# Patient Record
Sex: FEMALE | Race: WHITE | NOT HISPANIC OR LATINO | Employment: OTHER | URBAN - NONMETROPOLITAN AREA
[De-identification: names, ages, dates, MRNs, and addresses within clinical notes are randomized per-mention and may not be internally consistent; named-entity substitution may affect disease eponyms.]

---

## 2021-01-27 DIAGNOSIS — Z23 ENCOUNTER FOR IMMUNIZATION: ICD-10-CM

## 2024-11-13 ENCOUNTER — EVALUATION (OUTPATIENT)
Dept: PHYSICAL THERAPY | Facility: CLINIC | Age: 79
End: 2024-11-13
Payer: MEDICARE

## 2024-11-13 DIAGNOSIS — R26.2 DIFFICULTY WALKING: Primary | ICD-10-CM

## 2024-11-13 DIAGNOSIS — R42 DIZZINESS: ICD-10-CM

## 2024-11-13 PROCEDURE — 97112 NEUROMUSCULAR REEDUCATION: CPT

## 2024-11-13 PROCEDURE — 97162 PT EVAL MOD COMPLEX 30 MIN: CPT

## 2024-11-13 NOTE — LETTER
2024    No Recipients    Patient: Kia Karimi   YOB: 1945   Date of Visit: 2024     Encounter Diagnosis     ICD-10-CM    1. Difficulty walking  R26.2       2. Dizziness  R42           Dear Dr. Kraft:    Thank you for your recent referral of Kia Karimi. Please review the attached evaluation summary from Kia's recent visit.     Please verify that you agree with the plan of care by signing the attached order.     If you have any questions or concerns, please do not hesitate to call.     I sincerely appreciate the opportunity to share in the care of one of your patients and hope to have another opportunity to work with you in the near future.       Sincerely,    Danielito Grier, PT      Referring Provider:      I certify that I have read the below Plan of Care and certify the need for these services furnished under this plan of treatment while under my care.                    FELIBERTO Koo  [address] [facility] [address]  Via Fax: [phone]          PT Evaluation     Today's date: 2024  Patient name: Kia Karimi  : 1945  MRN: [id number]  Referring provider: Angeles Kraft CRNP  Dx:   Encounter Diagnosis     ICD-10-CM    1. Difficulty walking  R26.2       2. Dizziness  R42           Start Time: 1045  Stop Time: 1145  Total time in clinic (min): 60 minutes    Assessment  Impairments: abnormal coordination, abnormal gait, activity intolerance, impaired balance, lacks appropriate home exercise program and safety issue  Symptom irritability: moderate    Assessment details: Patient is a 80 y/o female who presents to physical therapy with chronic dizziness and balance issues.  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  PT suspects ocular dysfunction is the main contributing factor.  All positional tests were negative.  All red flag signs and symptoms were negative.  Neuro re-ed was initiated to  improve VOR response and eye tracking.  Patient requires skilled PT to improve balance, improve safety, and decrease dizziness.  Plan to progress patient with oculomotor training and balance activities.     Understanding of Dx/Px/POC: good     Prognosis: good    Goals  STG 1. Patient will reports 50% improvement in dizziness in 2 weeks to display improved quality of life.  STG 2. Patient will report no decreased ability to perform ADLs in 2 weeks due to dizziness.    LTG 1. Patient will present with normal oculomotor testing and positional testing in 4 weeks to display normal vestibular function.  LTG 2.  Patient will report resolution of dizziness to improve quality of life and function in 4 weeks.     Plan  Patient would benefit from: PT eval and skilled physical therapy  Referral necessary: No  Planned modality interventions: cryotherapy and thermotherapy: hydrocollator packs    Planned therapy interventions: patient education, therapeutic activities, home exercise program, flexibility, postural training, manual therapy, neuromuscular re-education, canalith repositioning and therapeutic exercise    Frequency: 2x week  Duration in weeks: 8  Plan of Care beginning date: 11/13/2024  Plan of Care expiration date: 1/8/2025  Treatment plan discussed with: patient      Subjective Evaluation    History of Present Illness  Date of onset: 11/1/2023  Mechanism of injury: Patient reports she has been having vertigo for about a year or so.  Patient reports it has been worsening and she is tired of being dizzy so she decided to seek out treatment.  Patient reports the dizziness is always present.  Patient reports head turns worsen her dizziness.  Patient denies spinning sensation.  Patient reports she feels unsteady more so. Patient was trying to describe why her eye was not moving and described it as something like a stroke behind the eye.  PT suspects she is describing a retinal artery occlusion.  Patient did have difficulty  providing accurate history and subjective.          Recurrent probem    Patient Goals  Patient goals for therapy: improved balance  Patient goal: abolish dizziness  Pain  No pain reported    Social Support  Lives in: one-story house  Lives with: alone    Employment status: not working    Diagnostic Tests  MRI studies: normal  Treatments  Current treatment: physical therapy      Objective     Concurrent Complaints  Negative for headaches  Neuro Exam:     Dizziness  Positive for rocking or swaying.   Negative for vertigo, motion sickness and light-headedness.     Headaches   Patient reports headaches: No.     Oculomotor exam   Oculomotor ROM: diminished and Poor R eye tracking  Resting nystagmus: not present   Gaze holding nystagmus: not present left  and not present right  Smooth pursuits: within normal limits  Vertical saccades: hypometric  Horizontal saccades: hypometric   Convergence: R eye does not converge  Convergence: abnormal  Cover test: normal  Crossover test: normal  Head thrust: left normal and right normal    Positional testing   [place]-Hallpike   Left posterior canal: WNL  Right posterior canal: WNL  Roll test   Left horizontal canal: WNL  Right horizontal canal: WNL  Positional testing comment: Extreme difficulty with VOR reflex due to poor R eye tracking             Precautions: Hx of R Retinal artery occlusion       Manuals 11/13                                                                Neuro Re-Ed             Vert and Horiz Gaze Stab 2x20 ea            Saccades Vert and Horiz 2x20 ea            Pencil push up  2x20                                                               Ther Ex                                                                                                                     Ther Activity                                       Gait Training                                       Modalities

## 2024-11-15 ENCOUNTER — OFFICE VISIT (OUTPATIENT)
Dept: PHYSICAL THERAPY | Facility: CLINIC | Age: 79
End: 2024-11-15
Payer: MEDICARE

## 2024-11-15 DIAGNOSIS — R42 DIZZINESS: ICD-10-CM

## 2024-11-15 DIAGNOSIS — R26.2 DIFFICULTY WALKING: Primary | ICD-10-CM

## 2024-11-15 PROCEDURE — 97112 NEUROMUSCULAR REEDUCATION: CPT

## 2024-11-15 NOTE — PROGRESS NOTES
"Daily Note     Today's date: 11/15/2024  Patient name: Kia Karimi  : 1945  MRN: [id number]  Referring provider: Angeles Kraft CRNP  Dx:   Encounter Diagnosis     ICD-10-CM    1. Difficulty walking  R26.2       2. Dizziness  R42           Start Time: 945  Stop Time: 1020  Total time in clinic (min): 35 minutes    Subjective: Patient reports her dizziness is about the same today.  Patient reports she has been keeping up with the exercises at home.      Objective: See treatment diary below      Assessment: Tolerated treatment well. Patient would benefit from continued PT.  Patient tolerated VOR and oculomotor exercises well.  Patient has much greater ease with saccadic motions than VOR.  Static balance progressions were added to improve safety and stability.  Will progress patient with continued balance and oculomotor progressions.      Plan: Continue per plan of care.  Progress treatment as tolerated.       Precautions: Hx of R Retinal artery occlusion       Manuals 11/13 11/15                                                               Neuro Re-Ed             Vert and Horiz Gaze Stab 2x20 ea 2x20 ea           Saccades Vert and Horiz 2x20 ea 2x20 ea            Pencil push up  2x20           Diagonal Saccades  2x20 ea           SLS   3x30\"           NBOS EC  3x30\"           NBOS Foam  3x30\"           Ther Ex                                                                                                                     Ther Activity                                       Gait Training                                       Modalities                                            "

## 2024-11-18 ENCOUNTER — OFFICE VISIT (OUTPATIENT)
Dept: PHYSICAL THERAPY | Facility: CLINIC | Age: 79
End: 2024-11-18
Payer: MEDICARE

## 2024-11-18 DIAGNOSIS — R42 DIZZINESS: Primary | ICD-10-CM

## 2024-11-18 DIAGNOSIS — R26.2 DIFFICULTY WALKING: ICD-10-CM

## 2024-11-18 PROCEDURE — 97112 NEUROMUSCULAR REEDUCATION: CPT

## 2024-11-18 NOTE — PROGRESS NOTES
"Daily Note     Today's date: 2024  Patient name: Kia Karimi  : 1945  MRN: [id number]  Referring provider: Angeles Kraft CRNP  Dx:   Encounter Diagnosis     ICD-10-CM    1. Dizziness  R42       2. Difficulty walking  R26.2           Start Time: 955  Stop Time: 1040  Total time in clinic (min): 45 minutes    Subjective: Patient notes some improvement in her dizziness since starting PT.  Patient reports the exercises have been getting easier.      Objective: See treatment diary below      Assessment: Tolerated treatment well. Patient would benefit from continued PT.  Oculomotor exercise were continued in order to improve visual tracking and decrease corrective saccades.  Balance exercises were performed today and progressed with dynamic balance as well.  Blaze pod drills also added today.  Patient tolerated all exercises well.  VOR exercises provide the greatest challenge for patient.  Patient is doing well with balance exercises.  Plan to focus on more functional dynamic progressions.      Plan: Continue per plan of care.  Progress treatment as tolerated.       Precautions: Hx of R Retinal artery occlusion       Manuals 11/13 11/15 11/18                                                              Neuro Re-Ed             Vert and Horiz Gaze Stab 2x20 ea 2x20 ea 2x20 ea           Saccades Vert and Horiz 2x20 ea 2x20 ea  2x20 ea          Pencil push up  2x20 2x20          Diagonal Saccades  2x20 ea 2x20 ea          SLS   3x30\" 3x30\"          NBOS EC  3x30\" 3x30\"          NBOS Foam  3x30\" 3x30\"          Dynamic Balance EC, Tandem   2 laps ea          Blaze Pods    3x30\"                                    Ther Ex                                                                                                                     Ther Activity                                       Gait Training                                       Modalities                                              "

## 2024-11-21 ENCOUNTER — OFFICE VISIT (OUTPATIENT)
Dept: PHYSICAL THERAPY | Facility: CLINIC | Age: 79
End: 2024-11-21
Payer: MEDICARE

## 2024-11-21 DIAGNOSIS — R42 DIZZINESS: Primary | ICD-10-CM

## 2024-11-21 DIAGNOSIS — R26.2 DIFFICULTY WALKING: ICD-10-CM

## 2024-11-21 PROCEDURE — 97112 NEUROMUSCULAR REEDUCATION: CPT

## 2024-11-21 NOTE — PROGRESS NOTES
"Daily Note     Today's date: 2024  Patient name: Kia Karimi  : 1945  MRN: [id number]  Referring provider: Angeles Kraft CRNP  Dx:   Encounter Diagnosis     ICD-10-CM    1. Dizziness  R42       2. Difficulty walking  R26.2           Start Time: 915  Stop Time: 1000  Total time in clinic (min): 45 minutes    Subjective: Patient reports yesterday was a good day with the dizziness.  She reports that is the first good day she has had in a while.      Objective: See treatment diary below      Assessment: Tolerated treatment well. Patient would benefit from continued PT.  Patient was progressed today with standing blaze pod taps to improve visual tracking.  Patient is performing exercises with greater ease.      Plan: Continue per plan of care.  Progress treatment as tolerated.       Precautions: Hx of R Retinal artery occlusion       Manuals 11/13 11/15 11/18 11/21                                                             Neuro Re-Ed             Vert and Horiz Gaze Stab 2x20 ea 2x20 ea 2x20 ea  2x20 ea         Saccades Vert and Horiz 2x20 ea 2x20 ea  2x20 ea 2x20 ea         Pencil push up  2x20 2x20 2x20         Diagonal Saccades  2x20 ea 2x20 ea 2x20 ea         SLS   3x30\" 3x30\" 3x30\"         NBOS EC  3x30\" 3x30\" 3x30\"         NBOS Foam  3x30\" 3x30\" 3x30\"         Dynamic Balance EC, Tandem   2 laps ea 2 laps ea         Blaze Pods    3x30\" 3x30\"         Blaze Pods at Wall    3x30\"                      Ther Ex                                                                                                                     Ther Activity                                       Gait Training                                       Modalities                                                  "

## 2024-12-03 ENCOUNTER — OFFICE VISIT (OUTPATIENT)
Dept: PHYSICAL THERAPY | Facility: CLINIC | Age: 79
End: 2024-12-03
Payer: MEDICARE

## 2024-12-03 DIAGNOSIS — R26.2 DIFFICULTY WALKING: ICD-10-CM

## 2024-12-03 DIAGNOSIS — R42 DIZZINESS: Primary | ICD-10-CM

## 2024-12-03 PROCEDURE — 97112 NEUROMUSCULAR REEDUCATION: CPT

## 2024-12-03 NOTE — PROGRESS NOTES
"Daily Note     Today's date: 12/3/2024  Patient name: Kia Karimi  : 1945  MRN: [id number]  Referring provider: Angeles Kraft CRNP  Dx:   Encounter Diagnosis     ICD-10-CM    1. Dizziness  R42       2. Difficulty walking  R26.2                      Subjective: Patient reports that she had no dizziness but balance is still limited. Her main issue is if she moves her head fast vertically.       Objective: See treatment diary below.       Assessment: Tolerated treatment fair. Patient would benefit from continued PT to improve balance and stability for functionality. She is unsteady in a NBOS, on uneven surfaces, and with faster head turns. No dizziness during saccades or gaze stabilization. Will progress as able and to tolerance.       Plan: Continue per plan of care.      Precautions: Hx of R Retinal artery occlusion       Manuals 11/13 11/15 11/18 11/21 12/3                                                  Neuro Re-Ed           Vert and Horiz Gaze Stab 2x20 ea 2x20 ea 2x20 ea  2x20 ea 2x20      Saccades Vert and Horiz 2x20 ea 2x20 ea  2x20 ea 2x20 ea 2x20      Pencil push up  2x20 2x20 2x20 2x20      Diagonal Saccades  2x20 ea 2x20 ea 2x20 ea 2x20      SLS   3x30\" 3x30\" 3x30\" 30\"x3      Tandem stance     Firm 30\"x3       NBOS EC  3x30\" 3x30\" 3x30\" 30\"x3      NBOS Foam  3x30\" 3x30\" 3x30\" 30\"x3      Dynamic Balance EC, Tandem   2 laps ea 2 laps ea 2 laps       NBOS on Foam head turns     10x ea      Blaze Pods    3x30\" 3x30\"       Blaze Pods at Wall    3x30\"                  Ther Ex                                                                                                   Ther Activity                                 Gait Training                                 Modalities                                              "

## 2024-12-05 ENCOUNTER — APPOINTMENT (OUTPATIENT)
Dept: PHYSICAL THERAPY | Facility: CLINIC | Age: 79
End: 2024-12-05
Payer: MEDICARE

## 2024-12-10 ENCOUNTER — OFFICE VISIT (OUTPATIENT)
Dept: PHYSICAL THERAPY | Facility: CLINIC | Age: 79
End: 2024-12-10
Payer: MEDICARE

## 2024-12-10 DIAGNOSIS — R26.2 DIFFICULTY WALKING: ICD-10-CM

## 2024-12-10 DIAGNOSIS — R42 DIZZINESS: Primary | ICD-10-CM

## 2024-12-10 PROCEDURE — 97112 NEUROMUSCULAR REEDUCATION: CPT

## 2024-12-10 NOTE — PROGRESS NOTES
"Daily Note     Today's date: 12/10/2024  Patient name: Kia Karimi  : 1945  MRN: [id number]  Referring provider: Angeles Kraft CRNP  Dx:   Encounter Diagnosis     ICD-10-CM    1. Dizziness  R42       2. Difficulty walking  R26.2           Start Time: 1300  Stop Time: 1345  Total time in clinic (min): 45 minutes    Subjective: Patient reports her dizziness has gotten a bit better.  She reports her balance is also gradually improving and she feels more steady when walking.      Objective: See treatment diary below      Assessment: Tolerated treatment well. Patient would benefit from continued PT.  Oculomotor exercises were continued to improve VOR and visual tracking.  Patient was able to perform exercises with less cueing this time.  Progressions from last session were continued and still provided adequate challenge.  Plan to progress patient with continued oculomotor and balance progressions to improve safety and function.      Plan: Continue per plan of care.  Progress treatment as tolerated.       Precautions: Hx of R Retinal artery occlusion       Manuals 11/13 11/15 11/18 11/21 12/3 12/10                                                 Neuro Re-Ed           Vert and Horiz Gaze Stab 2x20 ea 2x20 ea 2x20 ea  2x20 ea 2x20 2x20     Saccades Vert and Horiz 2x20 ea 2x20 ea  2x20 ea 2x20 ea 2x20 2x20     Pencil push up  2x20 2x20 2x20 2x20 2x20     Diagonal Saccades  2x20 ea 2x20 ea 2x20 ea 2x20 2x20     SLS   3x30\" 3x30\" 3x30\" 30\"x3 30\"x3     Tandem stance     Firm 30\"x3       NBOS EC  3x30\" 3x30\" 3x30\" 30\"x3 30\"x3     NBOS Foam  3x30\" 3x30\" 3x30\" 30\"x3 30\"x3     Dynamic Balance EC, Tandem   2 laps ea 2 laps ea 2 laps       NBOS on Foam head turns     10x ea 10xea     Blaze Pods    3x30\" 3x30\"  2x30\"     Blaze Pods at Wall    3x30\"  3x30\"                Ther Ex                                                                                                   Ther Activity                               "   Gait Training                                 Modalities

## 2024-12-12 ENCOUNTER — OFFICE VISIT (OUTPATIENT)
Dept: PHYSICAL THERAPY | Facility: CLINIC | Age: 79
End: 2024-12-12
Payer: MEDICARE

## 2024-12-12 DIAGNOSIS — R42 DIZZINESS: Primary | ICD-10-CM

## 2024-12-12 DIAGNOSIS — R26.2 DIFFICULTY WALKING: ICD-10-CM

## 2024-12-12 PROCEDURE — 97112 NEUROMUSCULAR REEDUCATION: CPT

## 2024-12-12 NOTE — PROGRESS NOTES
"Daily Note     Today's date: 2024  Patient name: Kia Karimi  : 1945  MRN: [id number]  Referring provider: Angeles Kraft CRNP  Dx:   Encounter Diagnosis     ICD-10-CM    1. Dizziness  R42       2. Difficulty walking  R26.2           Start Time: 1300  Stop Time: 1340  Total time in clinic (min): 40 minutes    Subjective: Patient reports her balance is improving.  She reports the dizziness is slightly improving.   She reports she is going to get her vision checked to see if that is playing a role.      Objective: See treatment diary below      Assessment: Tolerated treatment well. Patient would benefit from continued PT.  All oculomotor exercises were continued with greater ease today.  Plan to add movement to VOR exercises next session.  Patient is displaying less LOB with dynamic and static balance exercises.        Plan: Continue per plan of care.  Progress treatment as tolerated.       Precautions: Hx of R Retinal artery occlusion       Manuals 11/13 11/15 11/18 11/21 12/3 12/10 12/12                                                Neuro Re-Ed           Vert and Horiz Gaze Stab 2x20 ea 2x20 ea 2x20 ea  2x20 ea 2x20 2x20 2x20 2x20 w/ movement   Saccades Vert and Horiz 2x20 ea 2x20 ea  2x20 ea 2x20 ea 2x20 2x20 2x20    Pencil push up  2x20 2x20 2x20 2x20 2x20 2x20    Diagonal Saccades  2x20 ea 2x20 ea 2x20 ea 2x20 2x20 2x20    SLS   3x30\" 3x30\" 3x30\" 30\"x3 30\"x3 30\"x3    Tandem stance     Firm 30\"x3  Firm 30\"x3 Firm 30\"x3    NBOS EC  3x30\" 3x30\" 3x30\" 30\"x3 30\"x3 30\"x3 on foam    NBOS Foam  3x30\" 3x30\" 3x30\" 30\"x3 30\"x3 D/C    Dynamic Balance EC, Tandem   2 laps ea 2 laps ea 2 laps  2 laps 2 laps    NBOS on Foam head turns     10x ea 10xea 10x ea    Blaze Pods    3x30\" 3x30\"  2x30\" 2x30\"    Blaze Pods at Wall    3x30\"  3x30\" 3x30\"    Trunk Twists on foam       20x     Ther Ex                                                                                                   Ther Activity               "                   Gait Training                                 Modalities

## 2024-12-17 ENCOUNTER — EVALUATION (OUTPATIENT)
Dept: PHYSICAL THERAPY | Facility: CLINIC | Age: 79
End: 2024-12-17
Payer: MEDICARE

## 2024-12-17 DIAGNOSIS — R42 DIZZINESS: Primary | ICD-10-CM

## 2024-12-17 DIAGNOSIS — R26.2 DIFFICULTY WALKING: ICD-10-CM

## 2024-12-17 PROCEDURE — 97112 NEUROMUSCULAR REEDUCATION: CPT

## 2024-12-17 NOTE — PROGRESS NOTES
PT Re-Evaluation     Today's date: 2024  Patient name: Kia Karimi  : 1945  MRN: [id number]  Referring provider: Angeles Kraft CRNP  Dx:   Encounter Diagnosis     ICD-10-CM    1. Dizziness  R42       2. Difficulty walking  R26.2             Start Time: 1300  Stop Time: 1330  Total time in clinic (min): 30 minutes    Assessment  Impairments: abnormal coordination, abnormal gait, activity intolerance, impaired balance, lacks appropriate home exercise program and safety issue  Symptom irritability: moderate    Assessment details: Patient is a 78 y/o female who presents to physical therapy with chronic dizziness and balance issues.  Patient has made no subjective or objective progress since beginning PT.  Ocular tracking appears to be playing a big role in dizziness and balance issues which was a result of a R eye stroke.  Patient is being discharged today due to lack of progress.  She was instructed to follow up with her PCP and keep her eye appt in order to better assess vision's role in her issue.  Patient does not have any indication of BPPV as per subjective and objective findings.    Understanding of Dx/Px/POC: good     Prognosis: good    Goals  STG 1. Patient will reports 50% improvement in dizziness in 2 weeks to display improved quality of life. Not met  STG 2. Patient will report no decreased ability to perform ADLs in 2 weeks due to dizziness. Not met    LTG 1. Patient will present with normal oculomotor testing and positional testing in 4 weeks to display normal vestibular function. Not met  LTG 2.  Patient will report resolution of dizziness to improve quality of life and function in 4 weeks.  Not met    Plan  Patient would benefit from: PT eval and skilled physical therapy  Referral necessary: No  Planned modality interventions: cryotherapy and thermotherapy: hydrocollator packs    Planned therapy interventions: patient education, therapeutic activities, home exercise program,  flexibility, postural training, manual therapy, neuromuscular re-education, canalith repositioning and therapeutic exercise    Frequency: 2x week  Duration in weeks: 8  Plan of Care beginning date: 11/13/2024  Plan of Care expiration date: 1/8/2025  Treatment plan discussed with: patient      Subjective Evaluation    History of Present Illness  Date of onset: 11/1/2023  Mechanism of injury: Patient reports she has been having vertigo for about a year or so.  Patient reports it has been worsening and she is tired of being dizzy so she decided to seek out treatment.  Patient reports the dizziness is always present.  Patient reports head turns worsen her dizziness.  Patient denies spinning sensation.  Patient reports she feels unsteady more so. Patient was trying to describe why her eye was not moving and described it as something like a stroke behind the eye.  PT suspects she is describing a retinal artery occlusion.  Patient did have difficulty providing accurate history and subjective.    12/17 Update:  Patient reports no progress with her balance or dizziness despite 1 month of PT.  Patient has been compliant with HEP.  Patient continues to feel dizzy and unsteady.  Patient has an eye doctor appointment scheduled as vision appears to be contributing.          Recurrent probem    Patient Goals  Patient goals for therapy: improved balance  Patient goal: abolish dizziness  Pain  No pain reported    Social Support  Lives in: one-story house  Lives with: alone    Employment status: not working    Diagnostic Tests  MRI studies: normal  Treatments  Current treatment: physical therapy      Objective     Concurrent Complaints  Negative for headaches  Neuro Exam:     Dizziness  Positive for rocking or swaying.   Negative for vertigo, motion sickness and light-headedness.     Headaches   Patient reports headaches: No.     Oculomotor exam   Oculomotor ROM: diminished and Poor R eye tracking  Resting nystagmus: not present  "  Gaze holding nystagmus: not present left  and not present right  Smooth pursuits: within normal limits  Vertical saccades: hypometric  Horizontal saccades: hypometric   Convergence: R eye does not converge  Convergence: abnormal  Cover test: normal  Crossover test: normal  Head thrust: left normal and right normal    Positional testing   [place]-Hallpike   Left posterior canal: WNL  Right posterior canal: WNL  Roll test   Left horizontal canal: WNL  Right horizontal canal: WNL  Positional testing comment: Extreme difficulty with VOR reflex due to poor R eye tracking             Precautions: Hx of R Retinal artery occlusion       Manuals 11/13 11/15 11/18 11/21 12/3 12/10 12/12 12/17                                               Neuro Re-Ed           Vert and Horiz Gaze Stab 2x20 ea 2x20 ea 2x20 ea  2x20 ea 2x20 2x20 2x20 2x20   Saccades Vert and Horiz 2x20 ea 2x20 ea  2x20 ea 2x20 ea 2x20 2x20 2x20 2x20   Pencil push up  2x20 2x20 2x20 2x20 2x20 2x20 2x20   Diagonal Saccades  2x20 ea 2x20 ea 2x20 ea 2x20 2x20 2x20 2x20   SLS   3x30\" 3x30\" 3x30\" 30\"x3 30\"x3 30\"x3 NT   Tandem stance     Firm 30\"x3  Firm 30\"x3 Firm 30\"x3 NT   NBOS EC  3x30\" 3x30\" 3x30\" 30\"x3 30\"x3 30\"x3 on foam NT   NBOS Foam  3x30\" 3x30\" 3x30\" 30\"x3 30\"x3 D/C    Dynamic Balance EC, Tandem   2 laps ea 2 laps ea 2 laps  2 laps 2 laps NT   NBOS on Foam head turns     10x ea 10xea 10x ea NT   Blaze Pods    3x30\" 3x30\"  2x30\" 2x30\" NT   Blaze Pods at Wall    3x30\"  3x30\" 3x30\" NT   Trunk Twists on foam       20x  NT   Ther Ex                                                                                                   Ther Activity                                 Gait Training                                 Modalities                                     "

## 2024-12-19 ENCOUNTER — APPOINTMENT (OUTPATIENT)
Dept: PHYSICAL THERAPY | Facility: CLINIC | Age: 79
End: 2024-12-19
Payer: MEDICARE